# Patient Record
Sex: MALE | Race: WHITE | NOT HISPANIC OR LATINO | Employment: OTHER | URBAN - METROPOLITAN AREA
[De-identification: names, ages, dates, MRNs, and addresses within clinical notes are randomized per-mention and may not be internally consistent; named-entity substitution may affect disease eponyms.]

---

## 2021-09-21 ENCOUNTER — OFFICE VISIT (OUTPATIENT)
Dept: ORTHOPEDIC SURGERY | Facility: CLINIC | Age: 56
End: 2021-09-21

## 2021-09-21 VITALS
HEART RATE: 71 BPM | BODY MASS INDEX: 28.89 KG/M2 | WEIGHT: 190.6 LBS | HEIGHT: 68 IN | SYSTOLIC BLOOD PRESSURE: 177 MMHG | DIASTOLIC BLOOD PRESSURE: 100 MMHG

## 2021-09-21 DIAGNOSIS — S46.211A BICEPS STRAIN, RIGHT, INITIAL ENCOUNTER: Primary | ICD-10-CM

## 2021-09-21 DIAGNOSIS — M25.521 RIGHT ELBOW PAIN: ICD-10-CM

## 2021-09-21 PROCEDURE — 99204 OFFICE O/P NEW MOD 45 MIN: CPT | Performed by: ORTHOPAEDIC SURGERY

## 2021-09-21 RX ORDER — MELOXICAM 7.5 MG/1
TABLET ORAL
Qty: 30 TABLET | Refills: 1 | Status: SHIPPED | OUTPATIENT
Start: 2021-09-21

## 2021-09-21 RX ORDER — ROSUVASTATIN CALCIUM 5 MG/1
TABLET, COATED ORAL
COMMUNITY
Start: 2021-07-08

## 2021-09-21 RX ORDER — LOSARTAN POTASSIUM 50 MG/1
TABLET ORAL
COMMUNITY
Start: 2021-09-16

## 2021-09-21 RX ORDER — METHYLPREDNISOLONE 4 MG/1
TABLET ORAL
Qty: 1 EACH | Refills: 0 | Status: SHIPPED | OUTPATIENT
Start: 2021-09-21 | End: 2021-10-14

## 2021-09-21 NOTE — PROGRESS NOTES
[facility] [place] [facility] [place] - Krunal Sparks MD    Office Visit       Patient Name: Leonard Colin    Chief Complaint: Right elbow pain    Referring Physician: Yasmani Scales PA-I appreciate the referral    History of Present Illness:   Leonard Colin is a 56 y.o. male who presents with right body part: elbow Reason: pain.  Onset:Onset: atraumatic and gradual in nature. The issue has been ongoing for 3 month(s). Pain is a 4/10 on the pain scale. Pain is described as Pain Characterization: aching. Associated symptoms include Symptoms: pain and stiffness. The pain is worse with any movement of the joint; resting and pain medication and/or NSAID improve the pain. Previous treatments have included: NSAIDS.  I have reviewed the patient's history of present illness as noted/entered above.    I have reviewed the patient's past medical history, surgical history, social history, family history, medications, and allergies as noted in the electronic medical record and as noted/entered.  I have reviewed the patient's review of systems as noted/enter and updated as noted in the patient's HPI.    Right elbow pain worse over the last 3 months  Pain at the distal biceps insertion, no obvious acute tearing but findings consistent with distal biceps sprain and possibly partial tearing    Leigh Ann Alicea - at [facility], [facility]      Subjective   Subjective      Review of Systems   Constitutional: Negative.  Negative for chills, fatigue and fever.   HENT: Negative.  Negative for congestion and dental problem.    Eyes: Negative.  Negative for blurred vision.   Respiratory: Negative.  Negative for shortness of breath.    Cardiovascular: Negative.  Negative for leg swelling.   Gastrointestinal: Negative.  Negative for abdominal pain.   Endocrine: Negative.  Negative for polyuria.   Genitourinary: Negative.  Negative for difficulty urinating.  "  Musculoskeletal: Positive for arthralgias.   Skin: Negative.    Allergic/Immunologic: Negative.    Neurological: Negative.    Hematological: Negative.  Negative for adenopathy.   Psychiatric/Behavioral: Negative.  Negative for behavioral problems.        Past Medical History:   Past Medical History:   Diagnosis Date   • Hypertension        Past Surgical History:   Past Surgical History:   Procedure Laterality Date   • VASECTOMY         Family History:   Family History   Problem Relation Age of Onset   • Hypertension Mother    • Lupus Mother    • Cancer Father        Social History:   Social History     Socioeconomic History   • Marital status:      Spouse name: Not on file   • Number of children: Not on file   • Years of education: Not on file   • Highest education level: Not on file   Tobacco Use   • Smoking status: Never Smoker   • Smokeless tobacco: Never Used   Substance and Sexual Activity   • Alcohol use: Never   • Drug use: Never   • Sexual activity: Defer       Medications:   Current Outpatient Medications:   •  losartan (COZAAR) 50 MG tablet, , Disp: , Rfl:   •  rosuvastatin (CRESTOR) 5 MG tablet, , Disp: , Rfl:   •  meloxicam (MOBIC) 7.5 MG tablet, 1 Oral Daily with food., Disp: 30 tablet, Rfl: 1  •  methylPREDNISolone (MEDROL) 4 MG dose pack, Use as directed by package instructions, Disp: 1 each, Rfl: 0    Allergies: No Known Allergies    The following portions of the patient's history were reviewed and updated as appropriate: allergies, current medications, past family history, past medical history, past social history, past surgical history and problem list.        Objective    Objective      Vital Signs:   Vitals:    09/21/21 1509   BP: 177/100   Pulse: 71   Weight: 86.5 kg (190 lb 9.6 oz)   Height: 171.5 cm (67.5\")       Ortho Exam:  RIGHT ELBOW   pain at the distal biceps insertion.  Pain with resisted supination and flexion of the elbow.  No obvious asymmetry in appearance, no bruising, " hook test intact.  No skin changes.  Well-perfused    Results Review:   Imaging Results (Last 24 Hours)     Procedure Component Value Units Date/Time    XR Elbow 2 View Right [[id number]] Resulted: 09/21/21 1550     Updated: 09/21/21 1550    Narrative:      Imaging: elbow x-rays 2 views - AP and lateral elbow x-ray views    Side: RIGHT elbow    Indication for elbow x-ray 2 views: elbow pain    Comparison: no comparison views available    Findings: no acute bony finding noted, no obvious soft tissue edema  Small bone island in the proximal radial bone noted.  No obvious bony   changes, no bony avulsion.    I personally reviewed the above x-rays and discussed with the patient.          Procedures             Assessment / Plan      Assessment/Plan:   Problem List Items Addressed This Visit        Other    Biceps strain, right, initial encounter - Primary    Relevant Medications    methylPREDNISolone (MEDROL) 4 MG dose pack    meloxicam (MOBIC) 7.5 MG tablet    Other Relevant Orders    MRI Elbow Right Without Contrast      Other Visit Diagnoses     Right elbow pain        Relevant Medications    methylPREDNISolone (MEDROL) 4 MG dose pack    meloxicam (MOBIC) 7.5 MG tablet    Other Relevant Orders    XR Elbow 2 View Right (Completed)    MRI Elbow Right Without Contrast        Right distal biceps strain versus partial tearing.  MRI recommended for the right distal biceps to assess for partial tearing in the setting of 3 months of worsening pain of the right distal biceps.    Medrol dosepak - risks and benefit of this medication was discussed including risks with Diabetes and bump in blood glucose.  A 6-day steroid Medrol dosepak was recommended followed by an NSAID after the dosepak is finished.      NSAIDS - risks and benefits of these medications were discussed.  These medications are certainly not without risks, but they can provide relief of pain caused due to inflammation.      Follow Up: AFTER RIGHT ELBOW MRI to  assess for possible distal biceps tearing        Krunal Sparks MD, FAAOS  Orthopedic Surgeon  Fellowship Trained Shoulder and Elbow Surgeon  [facility]  Orthopedics and Sports Medicine  [facility] [facility], [address]  [place] [address]    09/21/21  15:52 EDT    Please note that portions of this note may have been completed with a voice recognition program. Efforts were made to edit the dictations, but occasionally words are mistranscribed.

## 2021-09-29 ENCOUNTER — HOSPITAL ENCOUNTER (OUTPATIENT)
Dept: MRI IMAGING | Facility: HOSPITAL | Age: 56
Discharge: HOME | End: 2021-09-29
Admitting: ORTHOPAEDIC SURGERY

## 2021-09-29 DIAGNOSIS — M25.521 RIGHT ELBOW PAIN: ICD-10-CM

## 2021-09-29 DIAGNOSIS — S46.211A BICEPS STRAIN, RIGHT, INITIAL ENCOUNTER: ICD-10-CM

## 2021-09-29 PROCEDURE — 73221 MRI JOINT UPR EXTREM W/O DYE: CPT

## 2021-10-14 ENCOUNTER — OFFICE VISIT (OUTPATIENT)
Dept: ORTHOPEDIC SURGERY | Facility: CLINIC | Age: 56
End: 2021-10-14

## 2021-10-14 VITALS
HEIGHT: 68 IN | WEIGHT: 190.7 LBS | HEART RATE: 79 BPM | DIASTOLIC BLOOD PRESSURE: 91 MMHG | SYSTOLIC BLOOD PRESSURE: 168 MMHG | BODY MASS INDEX: 28.9 KG/M2

## 2021-10-14 DIAGNOSIS — S46.211A BICEPS STRAIN, RIGHT, INITIAL ENCOUNTER: ICD-10-CM

## 2021-10-14 DIAGNOSIS — M25.521 RIGHT ELBOW PAIN: Primary | ICD-10-CM

## 2021-10-14 PROCEDURE — 99213 OFFICE O/P EST LOW 20 MIN: CPT | Performed by: ORTHOPAEDIC SURGERY

## 2021-10-14 NOTE — PROGRESS NOTES
[facility] [place] [place]       Office Follow Up Visit       Patient Name: Leonard Colin Jr.    Chief Complaint:   Chief Complaint   Patient presents with   • Follow-up     3 week f/u--Biceps strain, right       Referring Physician: No ref. provider found    History of Present Illness:   It has been 3  week(s) since Leonard Colin Jr.'s last visit. Leonard Colin Jr. returns to clinic today for F/U: follow-up of rightBody Part: elbowReason: pain. The issue has been ongoing for 3 month(s). Leonard Colin Jr. rates HIS/HER: hispain at 4/10 on the pain scale. Previous/current treatments: NSAIDS and oral steroids. Current symptoms:Symptoms: pain. The pain is worse with lifting; pain medication and/or NSAID improves the pain. Overall, he/she: heis doing better. I have reviewed the patient's history of present illness as noted/entered above.    I have reviewed the patient's past medical history, surgical history, social history, family history, medications, and allergies as noted in the electronic medical record and as noted/entered.  I have reviewed the patient's review of systems as noted/enter and updated as noted in the patient's HPI.    Right elbow    10/14/2021: Fortunately seen some clinical improvements.  MRI completed of the right elbow does have some mild signal of the distal biceps but overall appears to be intact there is a possibility of some partial tearing, tendinopathy but otherwise appears intact.    History:  Right elbow pain worse over the last 3 months  Pain at the distal biceps insertion, no obvious acute tearing but findings consistent with distal biceps sprain and possibly partial tearing     Leigh Ann Alicea - at [facility], [facility]      Subjective   Subjective      Review of Systems     Past Medical History:   Past Medical History:   Diagnosis Date   • Hypertension        Past Surgical History:   Past Surgical  "History:   Procedure Laterality Date   • VASECTOMY         Family History:   Family History   Problem Relation Age of Onset   • Hypertension Mother    • Lupus Mother    • Cancer Father        Social History:   Social History     Socioeconomic History   • Marital status:    Tobacco Use   • Smoking status: Never Smoker   • Smokeless tobacco: Never Used   Substance and Sexual Activity   • Alcohol use: Never   • Drug use: Never   • Sexual activity: Defer       Medications:   Current Outpatient Medications:   •  losartan (COZAAR) 50 MG tablet, , Disp: , Rfl:   •  meloxicam (MOBIC) 7.5 MG tablet, 1 Oral Daily with food., Disp: 30 tablet, Rfl: 1  •  methylPREDNISolone (MEDROL) 4 MG dose pack, Use as directed by package instructions, Disp: 1 each, Rfl: 0  •  rosuvastatin (CRESTOR) 5 MG tablet, , Disp: , Rfl:     Allergies: No Known Allergies    The following portions of the patient's history were reviewed and updated as appropriate: allergies, current medications, past family history, past medical history, past social history, past surgical history and problem list.        Objective    Objective      Vital Signs:   Vitals:    10/14/21 0946   BP: 168/91   Pulse: 79   Weight: 86.5 kg (190 lb 11.2 oz)   Height: 171.5 cm (67.52\")       Ortho Exam:  Right elbow improvements noted with strength less function and biceps intact on clinical exam hook test intact    Results Review:  Imaging Results (Last 24 Hours)     ** No results found for the last 24 hours. **          MRI Elbow Right Without Contrast    Result Date: 9/30/2021  Findings consistent with distal biceps tendon strain. Some partial thickness tearing cannot be entirely excluded, particularly due to motion degraded study, but no well-defined tear is identified. No evidence of acute or healing trauma or significant degenerative change elsewhere.  D:  09/29/2021 E:  09/29/2021  This report was finalized on 9/30/2021 9:58 PM by Dr. Toribio House MD.        Interpreted " the MRI above does have some distal biceps strain but no obvious tearing to my official review appears to be intact    Procedures            Assessment / Plan      Assessment/Plan:   Problem List Items Addressed This Visit     None        Right distal bicep strain counseled on treatment moving forward conservative program counseled on nonoperative treatment no recommendation for surgery at this time.  Fortunately seeing some improvements he will continue with home exercise program and avoiding heavy lifting until he feels more recovered he will keep me updated if any issues arise    Follow Up: As needed      Krunal Sparks MD, FAAOS  Orthopedic Surgeon  Fellowship Trained Shoulder and Elbow Surgeon  [facility]  Orthopedics and Sports Medicine  [address] [place] [address], [address]  [place] [address]    10/14/21  10:23 EDT    Please note that portions of this note may have been completed with a voice recognition program. Efforts were made to edit the dictations, but occasionally words are mistranscribed.

## 2024-08-23 ENCOUNTER — APPOINTMENT (OUTPATIENT)
Facility: HOSPITAL | Age: 59
End: 2024-08-23
Payer: COMMERCIAL

## 2024-08-23 ENCOUNTER — HOSPITAL ENCOUNTER (EMERGENCY)
Facility: HOSPITAL | Age: 59
Discharge: HOME | End: 2024-08-23
Attending: EMERGENCY MEDICINE
Payer: COMMERCIAL

## 2024-08-23 VITALS
SYSTOLIC BLOOD PRESSURE: 144 MMHG | DIASTOLIC BLOOD PRESSURE: 91 MMHG | RESPIRATION RATE: 16 BRPM | WEIGHT: 195 LBS | HEIGHT: 68 IN | BODY MASS INDEX: 29.55 KG/M2 | HEART RATE: 82 BPM | TEMPERATURE: 98.3 F | OXYGEN SATURATION: 96 %

## 2024-08-23 DIAGNOSIS — R07.9 CHEST PAIN, UNSPECIFIED TYPE: Primary | ICD-10-CM

## 2024-08-23 LAB
ALBUMIN SERPL-MCNC: 4.6 G/DL (ref 3.5–5.2)
ALBUMIN/GLOB SERPL: 1.8 G/DL
ALP SERPL-CCNC: 85 U/L (ref 39–117)
ALT SERPL W P-5'-P-CCNC: 12 U/L (ref 1–41)
ANION GAP SERPL CALCULATED.3IONS-SCNC: 11.4 MMOL/L (ref 5–15)
AST SERPL-CCNC: 26 U/L (ref 1–40)
BASOPHILS # BLD AUTO: 0.04 10*3/MM3 (ref 0–0.2)
BASOPHILS NFR BLD AUTO: 0.6 % (ref 0–1.5)
BILIRUB SERPL-MCNC: 0.5 MG/DL (ref 0–1.2)
BUN SERPL-MCNC: 23 MG/DL (ref 8–23)
BUN/CREAT SERPL: 23.2 (ref 7–25)
CALCIUM SPEC-SCNC: 9 MG/DL (ref 8.6–10.5)
CHLORIDE SERPL-SCNC: 104 MMOL/L (ref 98–107)
CO2 SERPL-SCNC: 25.6 MMOL/L (ref 22–29)
CREAT SERPL-MCNC: 0.99 MG/DL (ref 0.76–1.27)
D DIMER PPP FEU-MCNC: 0.28 MCGFEU/ML (ref 0–0.61)
DEPRECATED RDW RBC AUTO: 40.3 FL (ref 37–54)
EGFRCR SERPLBLD CKD-EPI 2021: 86.7 ML/MIN/1.73
EOSINOPHIL # BLD AUTO: 0.22 10*3/MM3 (ref 0–0.4)
EOSINOPHIL NFR BLD AUTO: 3.1 % (ref 0.3–6.2)
ERYTHROCYTE [DISTWIDTH] IN BLOOD BY AUTOMATED COUNT: 13.2 % (ref 12.3–15.4)
GEN 5 2HR TROPONIN T REFLEX: 6 NG/L
GLOBULIN UR ELPH-MCNC: 2.5 GM/DL
GLUCOSE SERPL-MCNC: 131 MG/DL (ref 65–99)
HCT VFR BLD AUTO: 39 % (ref 37.5–51)
HGB BLD-MCNC: 12.8 G/DL (ref 13–17.7)
HOLD SPECIMEN: NORMAL
IMM GRANULOCYTES # BLD AUTO: 0.01 10*3/MM3 (ref 0–0.05)
IMM GRANULOCYTES NFR BLD AUTO: 0.1 % (ref 0–0.5)
LIPASE SERPL-CCNC: 24 U/L (ref 13–60)
LYMPHOCYTES # BLD AUTO: 1.59 10*3/MM3 (ref 0.7–3.1)
LYMPHOCYTES NFR BLD AUTO: 22.1 % (ref 19.6–45.3)
MCH RBC QN AUTO: 26.9 PG (ref 26.6–33)
MCHC RBC AUTO-ENTMCNC: 32.8 G/DL (ref 31.5–35.7)
MCV RBC AUTO: 82.1 FL (ref 79–97)
MONOCYTES # BLD AUTO: 0.44 10*3/MM3 (ref 0.1–0.9)
MONOCYTES NFR BLD AUTO: 6.1 % (ref 5–12)
NEUTROPHILS NFR BLD AUTO: 4.9 10*3/MM3 (ref 1.7–7)
NEUTROPHILS NFR BLD AUTO: 68 % (ref 42.7–76)
NT-PROBNP SERPL-MCNC: <36 PG/ML (ref 0–900)
PLATELET # BLD AUTO: 184 10*3/MM3 (ref 140–450)
PMV BLD AUTO: 9.8 FL (ref 6–12)
POTASSIUM SERPL-SCNC: 3.7 MMOL/L (ref 3.5–5.2)
PROT SERPL-MCNC: 7.1 G/DL (ref 6–8.5)
RBC # BLD AUTO: 4.75 10*6/MM3 (ref 4.14–5.8)
SODIUM SERPL-SCNC: 141 MMOL/L (ref 136–145)
TROPONIN T DELTA: -2 NG/L
TROPONIN T SERPL HS-MCNC: 8 NG/L
WBC NRBC COR # BLD AUTO: 7.2 10*3/MM3 (ref 3.4–10.8)
WHOLE BLOOD HOLD COAG: NORMAL
WHOLE BLOOD HOLD SPECIMEN: NORMAL

## 2024-08-23 PROCEDURE — 36415 COLL VENOUS BLD VENIPUNCTURE: CPT

## 2024-08-23 PROCEDURE — 80053 COMPREHEN METABOLIC PANEL: CPT | Performed by: EMERGENCY MEDICINE

## 2024-08-23 PROCEDURE — 85379 FIBRIN DEGRADATION QUANT: CPT | Performed by: PHYSICIAN ASSISTANT

## 2024-08-23 PROCEDURE — 99284 EMERGENCY DEPT VISIT MOD MDM: CPT

## 2024-08-23 PROCEDURE — 85025 COMPLETE CBC W/AUTO DIFF WBC: CPT | Performed by: EMERGENCY MEDICINE

## 2024-08-23 PROCEDURE — 84484 ASSAY OF TROPONIN QUANT: CPT | Performed by: EMERGENCY MEDICINE

## 2024-08-23 PROCEDURE — 93005 ELECTROCARDIOGRAM TRACING: CPT | Performed by: EMERGENCY MEDICINE

## 2024-08-23 PROCEDURE — 96374 THER/PROPH/DIAG INJ IV PUSH: CPT

## 2024-08-23 PROCEDURE — 83880 ASSAY OF NATRIURETIC PEPTIDE: CPT | Performed by: EMERGENCY MEDICINE

## 2024-08-23 PROCEDURE — 83690 ASSAY OF LIPASE: CPT | Performed by: EMERGENCY MEDICINE

## 2024-08-23 PROCEDURE — 71045 X-RAY EXAM CHEST 1 VIEW: CPT

## 2024-08-23 PROCEDURE — 93005 ELECTROCARDIOGRAM TRACING: CPT

## 2024-08-23 RX ORDER — LIDOCAINE HYDROCHLORIDE 20 MG/ML
5 SOLUTION OROPHARYNGEAL ONCE
Status: COMPLETED | OUTPATIENT
Start: 2024-08-23 | End: 2024-08-23

## 2024-08-23 RX ORDER — ALUMINA, MAGNESIA, AND SIMETHICONE 2400; 2400; 240 MG/30ML; MG/30ML; MG/30ML
15 SUSPENSION ORAL ONCE
Status: COMPLETED | OUTPATIENT
Start: 2024-08-23 | End: 2024-08-23

## 2024-08-23 RX ORDER — ASPIRIN 81 MG/1
324 TABLET, CHEWABLE ORAL ONCE
Status: COMPLETED | OUTPATIENT
Start: 2024-08-23 | End: 2024-08-23

## 2024-08-23 RX ORDER — FAMOTIDINE 10 MG/ML
20 INJECTION, SOLUTION INTRAVENOUS ONCE
Status: COMPLETED | OUTPATIENT
Start: 2024-08-23 | End: 2024-08-23

## 2024-08-23 RX ORDER — SODIUM CHLORIDE 0.9 % (FLUSH) 0.9 %
10 SYRINGE (ML) INJECTION AS NEEDED
Status: DISCONTINUED | OUTPATIENT
Start: 2024-08-23 | End: 2024-08-23 | Stop reason: HOSPADM

## 2024-08-23 RX ADMIN — FAMOTIDINE 20 MG: 10 INJECTION, SOLUTION INTRAVENOUS at 20:55

## 2024-08-23 RX ADMIN — ALUMINUM HYDROXIDE, MAGNESIUM HYDROXIDE, DIMETHICONE 15 ML: 400; 400; 40 SUSPENSION ORAL at 20:54

## 2024-08-23 RX ADMIN — ASPIRIN 243 MG: 81 TABLET, CHEWABLE ORAL at 18:12

## 2024-08-23 RX ADMIN — LIDOCAINE HYDROCHLORIDE 5 ML: 20 SOLUTION ORAL at 20:53

## 2024-08-24 NOTE — FSED PROVIDER NOTE
"Subjective  History of Present Illness:    Patient is a 59-year-old male presenting to the emergency department complaints of chest pain.  He states pain started approximately 3 days ago and has been persistent since that time.  He complains of a pressure in the left chest.  States symptoms do seem to worsen with ambulation and states he has noticed his heart rate has increased with any exertion.  He denies associated shortness of breath, vomiting, dizziness, diaphoresis.  Patient has no prior history of CAD and states he had a normal catheterization 3 years ago.  Past medical history significant for hypertension, dyslipidemia.  Non-smoker.  Follows with Dr. Sky for cardiology.       Nurses Notes reviewed and agree, including vitals, allergies, social history and prior medical history.     REVIEW OF SYSTEMS: All systems reviewed and not pertinent unless noted.  Review of Systems   Gastrointestinal:  Positive for abdominal pain.   All other systems reviewed and are negative.      Past Medical History:   Diagnosis Date    Hypertension        Allergies:    Patient has no known allergies.      Past Surgical History:   Procedure Laterality Date    VASECTOMY           Social History     Socioeconomic History    Marital status:    Tobacco Use    Smoking status: Never    Smokeless tobacco: Never   Substance and Sexual Activity    Alcohol use: Never    Drug use: Never    Sexual activity: Defer         Family History   Problem Relation Age of Onset    Hypertension Mother     Lupus Mother     Cancer Father        Objective  Physical Exam:  /91   Pulse 82   Temp 98.3 °F (36.8 °C) (Oral)   Resp 16   Ht 172.7 cm (68\")   Wt 88.5 kg (195 lb)   SpO2 96%   BMI 29.65 kg/m²      Physical Exam  Vitals and nursing note reviewed.   Constitutional:       Appearance: He is well-developed and normal weight.   HENT:      Head: Normocephalic.   Cardiovascular:      Rate and Rhythm: Normal rate and regular rhythm.      " Heart sounds: Normal heart sounds.   Pulmonary:      Effort: Pulmonary effort is normal.      Breath sounds: Normal breath sounds.   Chest:      Chest wall: No tenderness.   Musculoskeletal:         General: Normal range of motion.      Cervical back: Normal range of motion.   Skin:     General: Skin is warm and dry.   Neurological:      General: No focal deficit present.      Mental Status: He is alert and oriented to person, place, and time.   Psychiatric:         Mood and Affect: Mood normal.         Behavior: Behavior normal.         Procedures    ED Course:    Was evaluated for persistent chest pain and tachycardia.  EKG was negative for ischemic changes.  Troponins are normal x 2.  D-dimer is within normal limits.  He was given a GI cocktail and Pepcid but did not seem to have improvement of his symptoms.  He did have a heart cath 3 years ago that was reportedly normal.  I discussed admission for observation with the patient and his wife for chest pain rule out.  Patient states he does not want to stay the night and would prefer to do that on an outpatient basis.  He will be given a ambulatory referral to outpatient cardiology.  I did advise him to return to the emergency department with any worsening symptoms and he understands and agrees with this plan of care.  He is well-appearing with stable vitals,  ED Course as of 08/23/24 2116   Fri Aug 23, 2024   1814 KG: Normal sinus rhythm at 86, left anterior fascicular block, poor R wave [RONDA]      ED Course User Index  [RONDA] Tapan Soriano MD       Lab Results (last 24 hours)       Procedure Component Value Units Date/Time    High Sensitivity Troponin T [[id number]]  (Normal) Collected: 08/23/24 1753    Specimen: Blood Updated: 08/23/24 1816     HS Troponin T 8 ng/L     CBC & Differential [[id number]]  (Abnormal) Collected: 08/23/24 1753    Specimen: Blood Updated: 08/23/24 1802    Narrative:      The following orders were created for panel order CBC &  Differential.  Procedure                               Abnormality         Status                     ---------                               -----------         ------                     CBC Auto Differential[[id number]]        Abnormal            Final result                 Please view results for these tests on the individual orders.    Comprehensive Metabolic Panel [[id number]]  (Abnormal) Collected: 08/23/24 1753    Specimen: Blood Updated: 08/23/24 1819     Glucose 131 mg/dL      BUN 23 mg/dL      Creatinine 0.99 mg/dL      Sodium 141 mmol/L      Potassium 3.7 mmol/L      Chloride 104 mmol/L      CO2 25.6 mmol/L      Calcium 9.0 mg/dL      Total Protein 7.1 g/dL      Albumin 4.6 g/dL      ALT (SGPT) 12 U/L      AST (SGOT) 26 U/L      Alkaline Phosphatase 85 U/L      Total Bilirubin 0.5 mg/dL      Globulin 2.5 gm/dL      A/G Ratio 1.8 g/dL      BUN/Creatinine Ratio 23.2     Anion Gap 11.4 mmol/L      eGFR 86.7 mL/min/1.73     Narrative:      GFR Normal >60  Chronic Kidney Disease <60  Kidney Failure <15      Lipase [[id number]]  (Normal) Collected: 08/23/24 1753    Specimen: Blood Updated: 08/23/24 1819     Lipase 24 U/L     BNP [[id number]]  (Normal) Collected: 08/23/24 1753    Specimen: Blood Updated: 08/23/24 1817     proBNP <36.0 pg/mL     Narrative:      This assay is used as an aid in the diagnosis of individuals suspected of having heart failure. It can be used as an aid in the diagnosis of acute decompensated heart failure (ADHF) in patients presenting with signs and symptoms of ADHF to the emergency department (ED). In addition, NT-proBNP of <300 pg/mL indicates ADHF is not likely.    Age Range Result Interpretation  NT-proBNP Concentration (pg/mL:      <50             Positive            >450                   Gray                 300-450                    Negative             <300    50-75           Positive            >900                  Gray                300-900                  Negative          "   <300      >75             Positive            >1800                  Gray                300-1800                  Negative            <300    CBC Auto Differential [[id number]]  (Abnormal) Collected: 08/23/24 1753    Specimen: Blood Updated: 08/23/24 1802     WBC 7.20 10*3/mm3      RBC 4.75 10*6/mm3      Hemoglobin 12.8 g/dL      Hematocrit 39.0 %      MCV 82.1 fL      MCH 26.9 pg      MCHC 32.8 g/dL      RDW 13.2 %      RDW-SD 40.3 fl      MPV 9.8 fL      Platelets 184 10*3/mm3      Neutrophil % 68.0 %      Lymphocyte % 22.1 %      Monocyte % 6.1 %      Eosinophil % 3.1 %      Basophil % 0.6 %      Immature Grans % 0.1 %      Neutrophils, Absolute 4.90 10*3/mm3      Lymphocytes, Absolute 1.59 10*3/mm3      Monocytes, Absolute 0.44 10*3/mm3      Eosinophils, Absolute 0.22 10*3/mm3      Basophils, Absolute 0.04 10*3/mm3      Immature Grans, Absolute 0.01 10*3/mm3     D-dimer, Quantitative [[id number]]  (Normal) Collected: 08/23/24 1753    Specimen: Blood Updated: 08/23/24 1927     D-Dimer, Quantitative 0.28 MCGFEU/mL     Narrative:      According to the assay 's published package insert, a normal (<0.50 MCGFEU/mL) D-dimer result in conjunction with a non-high clinical probability assessment, excludes deep vein thrombosis (DVT) and pulmonary embolism (PE) with high sensitivity.    D-dimer values increase with age and this can make VTE exclusion of an older population difficult. To address this, the American College of Phys[employer], based on best available evidence and recent guidelines, recommends that clinicians use age-adjusted D-dimer thresholds in patients greater than 50 years of age with: a) a low probability of PE who do not meet all Pulmonary Embolism Rule Out Criteria, or b) in those with intermediate probability of PE.   The formula for an age-adjusted D-dimer cut-off is \"age/100\".  For example, a 60 year old patient would have an age-adjusted cut-off of 0.60 MCGFEU/mL and an 80 year old 0.80 " MCGFEU/mL.    High Sensitivity Troponin T 2Hr [[id number]]  (Normal) Collected: 08/23/24 2008    Specimen: Blood Updated: 08/23/24 2031     HS Troponin T 6 ng/L      Troponin T Delta -2 ng/L              XR Chest 1 View    Result Date: 8/23/2024  XR CHEST 1 VW Date of Exam: 8/23/2024 6:00 PM EDT Indication: Chest Pain Triage Protocol Comparison: None available. Findings: Lungs are well expanded and appear clear. No pneumothorax or large pleural effusion is seen. Cardiomediastinal contours are likely within normal limits, allowing for portable AP technique.     Impression: Impression: No acute cardiopulmonary abnormality is identified. Electronically Signed: Ana Quigley  8/23/2024 6:13 PM EDT  Workstation ID: [id number]        [facility]     Amount and/or Complexity of Data Reviewed  Clinical lab tests: reviewed  Tests in the radiology section of CPT®: reviewed  Tests in the medicine section of CPT®: reviewed          DDX: includes but is not limited to: Angina, pulmonary embolism, pneumonia, gastritis    Patient arrives POV with vitals interpreted by myself.       Interventions / Re-evaluation: GI cocktail and Pepcid were given without improvement of symptoms    Medications   sodium chloride 0.9 % flush 10 mL (has no administration in time range)   aspirin chewable tablet 324 mg (243 mg Oral Given 8/23/24 1812)   aluminum-magnesium hydroxide-simethicone (MAALOX MAX) 400-400-40 MG/5ML suspension 15 mL (15 mL Oral Given 8/23/24 2054)   Lidocaine Viscous HCl (XYLOCAINE) 2 % solution 5 mL (5 mL Mouth/Throat Given 8/23/24 2053)   famotidine (PEPCID) injection 20 mg (20 mg Intravenous Given 8/23/24 2055)       Results/clinical rationale were discussed with patient, wife        -----  ED Disposition       ED Disposition   Discharge    Condition   Stable    Comment   --             Final diagnoses:   Chest pain, unspecified type      Your Follow-Up Providers       [facility] CARDIOLOGY .    Specialty:  Cardiology  [address]  [facility] [address]  [phone]  Additional information:  Phone Number: [phone]. Located on the same side of the street as [place].  Entrance Information: Patients are encouraged to park in front of [facility], using Entrance [address], near the fountain.                      Contact information for after-discharge care    Follow-up information has not been specified.                    Your medication list        CONTINUE taking these medications        Instructions Last Dose Given Next Dose Due   losartan 50 MG tablet  Commonly known as: COZAAR           meloxicam 7.5 MG tablet  Commonly known as: MOBIC      1 Oral Daily with food.       rosuvastatin 5 MG tablet  Commonly known as: CRESTOR

## 2024-08-28 ENCOUNTER — OFFICE VISIT (OUTPATIENT)
Dept: CARDIOLOGY | Facility: CLINIC | Age: 59
End: 2024-08-28
Payer: COMMERCIAL

## 2024-08-28 VITALS
WEIGHT: 195 LBS | HEART RATE: 76 BPM | BODY MASS INDEX: 29.55 KG/M2 | DIASTOLIC BLOOD PRESSURE: 72 MMHG | OXYGEN SATURATION: 96 % | SYSTOLIC BLOOD PRESSURE: 136 MMHG | HEIGHT: 68 IN

## 2024-08-28 DIAGNOSIS — R00.0 TACHYCARDIA: ICD-10-CM

## 2024-08-28 DIAGNOSIS — Z86.79 HISTORY OF PERICARDITIS: ICD-10-CM

## 2024-08-28 DIAGNOSIS — R07.2 PRECORDIAL PAIN: Primary | ICD-10-CM

## 2024-08-28 RX ORDER — UBIDECARENONE 100 MG
100 CAPSULE ORAL DAILY
COMMUNITY

## 2024-08-28 RX ORDER — AMLODIPINE BESYLATE 5 MG/1
5 TABLET ORAL DAILY
COMMUNITY
Start: 2024-06-26

## 2024-08-28 NOTE — PROGRESS NOTES
[facility] Cardiology  Consultation H&P  Leonard Colin Jr.  1965  [address]  [facility] [address]     VISIT DATE:  24    PCP: Yasmani Scales PA [address] [facility] [id number]    IDENTIFICATION: A 59 y.o. male, , retired from the [employer], wife runs  home  Prev cardio: Jose Daniel    PROBLEM LIST:   Chest pain/CAD  Remote pericarditis, data deficit   LHC: 20% focal mid left circumflex, other coronaries normal, normal LV function  24 Negative ER evaluation  HTN  HLD  Insulin resistance  Family history of premature CAD  Surgical history:  Vasectomy      CC:  Chief Complaint   Patient presents with    Chest Pain       Allergies  No Known Allergies    Current Medications  Current Outpatient Medications   Medication Instructions    amLODIPine (NORVASC) 5 mg, Oral, Daily    coenzyme Q10 100 mg, Oral, Daily    losartan (COZAAR) 100 mg, Oral, Daily    rosuvastatin (CRESTOR) 5 mg, Oral, Daily        History of Present Illness   HPI  Leonard Colin Jr. is a 59 y.o. year old male with the above mentioned PMH who presents for consult from [facility] ER for evaluation of chest pain.  He states pain began approximately 1 week ago and is located mid chest, described as dull pressure, and is constant in nature.  Pain is constant at rest and he notices elevated heart rates with little exertion.  He wears a Apple Watch which reports heart rate up to 130 at times.  There are no precipitating or relieving factors.  No chest tenderness, pain with movement, or worsening pain with deep inspiration.  He has been evaluated several times over the last 10 years for chest pain but feels this pain is different.  Last cardiac evaluation in  included heart cath which revealed near normal coronaries, 20% focal mid left circumflex stenosis noted.  He was evaluated in the ER 2024 and declined admission for further testing.  Troponins, EKG, D-dimer all negative.  He does  "report having history of pericarditis over 10 years ago.  He has tried Tylenol and ibuprofen for pain without relief.  He reports home blood pressures are 130 systolic on amlodipine and losartan.  He is taking rosuvastatin for some time.  He does drink 2-3 bottles of water a day and several caffeinated beverages.  He denies dyspnea at rest, PND, lower extremity edema, or claudication.  He denies heartburn, indigestion.    Pt denies history of clotting disorder, CHF, DVT, PE, MI, CVA, TIA, or rheumatic fever.     ROS  Review of Systems   Cardiovascular:  Positive for chest pain and orthopnea.        Fast heart rates   Respiratory:  Positive for snoring.    Neurological:  Positive for dizziness.   All other systems reviewed and are negative.      SOCIAL HX  Social History     Socioeconomic History    Marital status:    Tobacco Use    Smoking status: Never     Passive exposure: Never    Smokeless tobacco: Never   Substance and Sexual Activity    Alcohol use: Never    Drug use: Never    Sexual activity: Defer       FAMILY HX  Family History   Problem Relation Age of Onset    Hypertension Mother     Lupus Mother     Cancer Father        Vitals:    08/28/24 0953   BP: 136/72   BP Location: Left arm   Patient Position: Sitting   Pulse: 76   SpO2: 96%   Weight: 88.5 kg (195 lb)   Height: 172.7 cm (68\")     Body mass index is 29.65 kg/m².     PHYSICAL EXAMINATION:  Vitals reviewed.   Constitutional:       Appearance: Normal and healthy appearance. Well-developed.   Neck:      Vascular: JVD normal.   Pulmonary:      Effort: Pulmonary effort is normal.      Breath sounds: Normal breath sounds.   Chest:      Chest wall: Not tender to palpatation.   Cardiovascular:      Normal rate. Regular rhythm. Normal S1. Normal S2.       Murmurs: There is no murmur.   Pulses:     Intact distal pulses.   Edema:     Peripheral edema absent.   Abdominal:      Palpations: Abdomen is soft.      Tenderness: There is no abdominal " "tenderness.   Skin:     General: Skin is warm and dry.   Neurological:      General: No focal deficit present.      Mental Status: Alert.   Psychiatric:         Behavior: Behavior is cooperative.         Diagnostic Data:  Procedures      LABS:  No results found for: \"CHOL\", \"CHLPL\", \"TRIG\", \"HDL\", \"LDL\", \"LDLDIRECT\"   Lab Results   Component Value Date    GLUCOSE 131 (H) 08/23/2024    CALCIUM 9.0 08/23/2024     08/23/2024    K 3.7 08/23/2024    CO2 25.6 08/23/2024     08/23/2024    BUN 23 08/23/2024    CREATININE 0.99 08/23/2024    EGFR 86.7 08/23/2024    BCR 23.2 08/23/2024    ANIONGAP 11.4 08/23/2024      Lab Results   Component Value Date    WBC 7.20 08/23/2024    HGB 12.8 (L) 08/23/2024    HCT 39.0 08/23/2024    MCV 82.1 08/23/2024     08/23/2024     No results found for: \"HGBA1C\"   No results found for: \"TSH\"           ASSESSMENT:     Diagnosis Plan   1. Precordial pain  Holter Monitor - 48 Hour    Adult Transthoracic Echo Complete W/ Cont if Necessary Per Protocol      2. Tachycardia  Holter Monitor - 48 Hour    Adult Transthoracic Echo Complete W/ Cont if Necessary Per Protocol      3. History of pericarditis            PLAN:    Precordial chest pain, atypical, constant in nature.  Reports tachycardia with minimal exertion. Near normal coronaries in 2021, history of pericarditis. EKG normal, troponins normal.  -Check echocardiogram  -Place 48 hour Holter to screen for tachyarrhythmias  -Would defer ischemic testing at this time.    HTN, well controlled  -Continue losartan and amlodipine    HLD, LD goal < 70  -continue rosuvastatin  -Lipid and A1c requested from PCP    Schedule follow up in 6 weeks, or sooner as needed. He is encouraged to contact office with any questions or concerns.      Joanne Gonzalez, APRN 08/28/24 10:22 EDT     "

## 2024-09-02 LAB
QT INTERVAL: 350 MS
QT INTERVAL: 362 MS
QTC INTERVAL: 409 MS
QTC INTERVAL: 418 MS

## 2024-11-04 ENCOUNTER — OFFICE VISIT (OUTPATIENT)
Dept: CARDIOLOGY | Facility: CLINIC | Age: 59
End: 2024-11-04
Payer: COMMERCIAL

## 2024-11-04 VITALS
SYSTOLIC BLOOD PRESSURE: 130 MMHG | HEART RATE: 82 BPM | HEIGHT: 68 IN | BODY MASS INDEX: 28.89 KG/M2 | OXYGEN SATURATION: 96 % | WEIGHT: 190.6 LBS | DIASTOLIC BLOOD PRESSURE: 72 MMHG

## 2024-11-04 DIAGNOSIS — E78.2 MIXED HYPERLIPIDEMIA: ICD-10-CM

## 2024-11-04 DIAGNOSIS — I10 PRIMARY HYPERTENSION: Primary | ICD-10-CM

## 2024-11-04 PROCEDURE — 99214 OFFICE O/P EST MOD 30 MIN: CPT

## 2024-11-04 RX ORDER — BENZONATATE 200 MG/1
200 CAPSULE ORAL DAILY PRN
COMMUNITY
Start: 2024-10-10 | End: 2024-11-04

## 2024-11-04 RX ORDER — AMOXICILLIN 875 MG/1
875 TABLET, COATED ORAL 2 TIMES DAILY
COMMUNITY
Start: 2024-10-12 | End: 2024-11-04

## 2024-11-04 RX ORDER — AZITHROMYCIN 250 MG/1
250 TABLET, FILM COATED ORAL DAILY
COMMUNITY
Start: 2024-10-15 | End: 2024-11-04

## 2024-11-04 RX ORDER — ASPIRIN 81 MG/1
81 TABLET ORAL DAILY
COMMUNITY

## 2024-11-04 RX ORDER — CHOLECALCIFEROL (VITAMIN D3) 25 MCG
1000 TABLET ORAL DAILY
COMMUNITY

## 2024-11-04 NOTE — PROGRESS NOTES
"[facility] Cardiology  Office Progress Note  Leonard Colin Jr.  1965  [address]  [facility] [address]     VISIT DATE:  24    PCP: Yasmani Scales PA [address] [facility] [address]    IDENTIFICATION: A 59 y.o. male, , retired from the [employer], wife runs  home  Prev cardio: Jose Daniel    PROBLEM LIST:   Chest pain/CAD  Remote pericarditis, data deficit   LHC: 20% focal mid left circumflex, other coronaries normal, normal LV function  24 Negative ER evaluation   48 hr Holter: 65-, <1% PAC/PVC   Echo: wnl ([facility])  HTN  HLD  Insulin resistance  Family history of premature CAD  Surgical history:  Vasectomy      CC:  Chief Complaint   Patient presents with    Precordial pain       Allergies  No Known Allergies    Current Medications  Current Outpatient Medications   Medication Instructions    amLODIPine (NORVASC) 5 mg, Daily    amoxicillin (AMOXIL) 875 mg, 2 Times Daily    aspirin 81 mg, Daily    azithromycin (ZITHROMAX) 250 mg, Daily    benzonatate (TESSALON) 200 mg, Daily PRN    cholecalciferol (VITAMIN D3) 1,000 Units, Daily    coenzyme Q10 100 mg, Daily    losartan (COZAAR) 100 mg, Daily    rosuvastatin (CRESTOR) 5 mg, Daily        History of Present Illness   HPI  Leonard Colin Jr. Presents for follow up today. He wore monitor which did not reveal significant abnormalities. Echo performed at [facility] reportedly normal. His chest pain has resolved. Home BP readings at target. Contemplating back evaluation after imaging completed.      OBJECTIVE:    Vitals:    24 1117   BP: 130/72   BP Location: Left arm   Patient Position: Sitting   Cuff Size: Adult   Pulse: 82   SpO2: 96%   Weight: 86.5 kg (190 lb 9.6 oz)   Height: 172.7 cm (67.99\")       Body mass index is 28.99 kg/m².     PHYSICAL EXAMINATION:  Vitals reviewed.   Constitutional:       Appearance: Normal and healthy appearance. Well-developed.   Neck:      Vascular: JVD normal. "   Pulmonary:      Effort: Pulmonary effort is normal.   Cardiovascular:      Normal rate.   Skin:     General: Skin is warm and dry.   Neurological:      General: No focal deficit present.      Mental Status: Alert.   Psychiatric:         Behavior: Behavior is cooperative.       Diagnostic Data:  Procedures        ASSESSMENT:    No diagnosis found.      PLAN:    Precordial chest pain, atypical, constant in nature.  Reports tachycardia with minimal exertion. Near normal coronaries in 2021, history of pericarditis. Echo and monitor within normal limits  -Pain has resolved.  -Continue heart healthy diet, exercise.     HTN, well controlled  -Continue losartan and amlodipine    HLD, LD goal < 70  -Continue rosuvastatin  -Lipid and A1c requested from PCP    Back pain-   - Recommended [facility] neurosurgery, he would need imaging prior.    He will see our office as needed. Encouraged to call should symptoms recur or other concerns arise.    Joanne Gonzalez, APRN 11/04/24 11:28 EST